# Patient Record
Sex: FEMALE | Race: BLACK OR AFRICAN AMERICAN | URBAN - METROPOLITAN AREA
[De-identification: names, ages, dates, MRNs, and addresses within clinical notes are randomized per-mention and may not be internally consistent; named-entity substitution may affect disease eponyms.]

---

## 2017-11-07 ENCOUNTER — TELEPHONE (OUTPATIENT)
Dept: FAMILY MEDICINE CLINIC | Facility: CLINIC | Age: 52
End: 2017-11-07

## 2017-11-08 NOTE — TELEPHONE ENCOUNTER
Spoke with patient who relayed she moved out of state in 2014, moved to [place] initially, but now is in [place]. Per discussion, pt will no longer be seeing Dr. Misty Hirsch. Will update [place]iedad.

## 2017-11-08 NOTE — TELEPHONE ENCOUNTER
Please call patient. Let her last visit was in 2013. If she has new PCP please remove my name as her PCP.   If she thinks that she will continue her medical care here please schedule complete physical so we can update all her preventive services thank you